# Patient Record
Sex: MALE | Race: WHITE | NOT HISPANIC OR LATINO | Employment: FULL TIME
[De-identification: names, ages, dates, MRNs, and addresses within clinical notes are randomized per-mention and may not be internally consistent; named-entity substitution may affect disease eponyms.]

---

## 2019-09-28 ENCOUNTER — HEALTH MAINTENANCE LETTER (OUTPATIENT)
Age: 59
End: 2019-09-28

## 2020-11-11 ENCOUNTER — OFFICE VISIT (OUTPATIENT)
Dept: FAMILY MEDICINE | Facility: CLINIC | Age: 60
End: 2020-11-11
Payer: COMMERCIAL

## 2020-11-11 VITALS
TEMPERATURE: 97.3 F | WEIGHT: 151 LBS | HEIGHT: 68 IN | DIASTOLIC BLOOD PRESSURE: 76 MMHG | RESPIRATION RATE: 16 BRPM | HEART RATE: 84 BPM | OXYGEN SATURATION: 99 % | BODY MASS INDEX: 22.88 KG/M2 | SYSTOLIC BLOOD PRESSURE: 134 MMHG

## 2020-11-11 DIAGNOSIS — Z00.00 ROUTINE GENERAL MEDICAL EXAMINATION AT A HEALTH CARE FACILITY: Primary | ICD-10-CM

## 2020-11-11 DIAGNOSIS — Z23 NEED FOR VACCINATION: ICD-10-CM

## 2020-11-11 DIAGNOSIS — Z11.59 ENCOUNTER FOR HEPATITIS C SCREENING TEST FOR LOW RISK PATIENT: ICD-10-CM

## 2020-11-11 DIAGNOSIS — Z11.4 SCREENING FOR HIV (HUMAN IMMUNODEFICIENCY VIRUS): ICD-10-CM

## 2020-11-11 LAB
ANION GAP SERPL CALCULATED.3IONS-SCNC: 5 MMOL/L (ref 3–14)
BUN SERPL-MCNC: 15 MG/DL (ref 7–30)
CALCIUM SERPL-MCNC: 8.8 MG/DL (ref 8.5–10.1)
CHLORIDE SERPL-SCNC: 105 MMOL/L (ref 94–109)
CHOLEST SERPL-MCNC: 176 MG/DL
CO2 SERPL-SCNC: 28 MMOL/L (ref 20–32)
CREAT SERPL-MCNC: 0.84 MG/DL (ref 0.66–1.25)
GFR SERPL CREATININE-BSD FRML MDRD: >90 ML/MIN/{1.73_M2}
GLUCOSE SERPL-MCNC: 97 MG/DL (ref 70–99)
HDLC SERPL-MCNC: 49 MG/DL
LDLC SERPL CALC-MCNC: 101 MG/DL
NONHDLC SERPL-MCNC: 127 MG/DL
POTASSIUM SERPL-SCNC: 4 MMOL/L (ref 3.4–5.3)
SODIUM SERPL-SCNC: 138 MMOL/L (ref 133–144)
TRIGL SERPL-MCNC: 130 MG/DL

## 2020-11-11 PROCEDURE — 87389 HIV-1 AG W/HIV-1&-2 AB AG IA: CPT | Performed by: FAMILY MEDICINE

## 2020-11-11 PROCEDURE — 80061 LIPID PANEL: CPT | Performed by: FAMILY MEDICINE

## 2020-11-11 PROCEDURE — 90471 IMMUNIZATION ADMIN: CPT | Performed by: FAMILY MEDICINE

## 2020-11-11 PROCEDURE — 99396 PREV VISIT EST AGE 40-64: CPT | Mod: 25 | Performed by: FAMILY MEDICINE

## 2020-11-11 PROCEDURE — 80048 BASIC METABOLIC PNL TOTAL CA: CPT | Performed by: FAMILY MEDICINE

## 2020-11-11 PROCEDURE — 86803 HEPATITIS C AB TEST: CPT | Performed by: FAMILY MEDICINE

## 2020-11-11 PROCEDURE — 90750 HZV VACC RECOMBINANT IM: CPT | Performed by: FAMILY MEDICINE

## 2020-11-11 PROCEDURE — 36415 COLL VENOUS BLD VENIPUNCTURE: CPT | Performed by: FAMILY MEDICINE

## 2020-11-11 ASSESSMENT — ENCOUNTER SYMPTOMS
NAUSEA: 0
SORE THROAT: 0
MYALGIAS: 0
COUGH: 0
WEAKNESS: 0
DYSURIA: 0
HEARTBURN: 0
ARTHRALGIAS: 0
HEMATOCHEZIA: 0
HEADACHES: 0
EYE PAIN: 0
PARESTHESIAS: 0
FREQUENCY: 0
CHILLS: 0
DIZZINESS: 0
CONSTIPATION: 0
NERVOUS/ANXIOUS: 0
ABDOMINAL PAIN: 0
PALPITATIONS: 0
HEMATURIA: 0
JOINT SWELLING: 0
DIARRHEA: 0
SHORTNESS OF BREATH: 0
FEVER: 0

## 2020-11-11 ASSESSMENT — MIFFLIN-ST. JEOR: SCORE: 1479.43

## 2020-11-11 ASSESSMENT — PAIN SCALES - GENERAL: PAINLEVEL: NO PAIN (0)

## 2020-11-11 NOTE — PROGRESS NOTES
SUBJECTIVE:   CC: Johny Jauregui is an 60 year old male who presents for preventative health visit.       Patient has been advised of split billing requirements and indicates understanding: Yes  Healthy Habits:     Getting at least 3 servings of Calcium per day:  Yes    Bi-annual eye exam:  Yes    Dental care twice a year:  Yes    Sleep apnea or symptoms of sleep apnea:  None    Diet:  Other    Frequency of exercise:  6-7 days/week    Duration of exercise:  30-45 minutes    Taking medications regularly:  Not Applicable    Barriers to taking medications:  None    Medication side effects:  Not applicable    PHQ-2 Total Score: 0    Additional concerns today:  Yes (screening blood test, vaccines)              Today's PHQ-2 Score:   PHQ-2 ( 1999 Pfizer) 11/11/2020   Q1: Little interest or pleasure in doing things 0   Q2: Feeling down, depressed or hopeless 0   PHQ-2 Score 0   Q1: Little interest or pleasure in doing things Not at all   Q2: Feeling down, depressed or hopeless Not at all   PHQ-2 Score 0       Abuse: Current or Past(Physical, Sexual or Emotional)- No  Do you feel safe in your environment? Yes    Have you ever done Advance Care Planning? (For example, a Health Directive, POLST, or a discussion with a medical provider or your loved ones about your wishes): Yes, patient states has an Advance Care Planning document and will bring a copy to the clinic.    Social History     Tobacco Use     Smoking status: Never Smoker     Smokeless tobacco: Never Used   Substance Use Topics     Alcohol use: Yes     Alcohol/week: 0.0 standard drinks     Comment: occ     If you drink alcohol do you typically have >3 drinks per day or >7 drinks per week? No    Alcohol Use 11/11/2020   Prescreen: >3 drinks/day or >7 drinks/week? No   Prescreen: >3 drinks/day or >7 drinks/week? -       Last PSA:   PSA   Date Value Ref Range Status   01/23/2009 1.53 0 - 4 ug/L Final       Reviewed orders with patient. Reviewed health maintenance and  updated orders accordingly - Yes  Labs reviewed in EPIC  BP Readings from Last 3 Encounters:   11/11/20 134/76   05/25/16 124/80   05/12/16 146/68    Wt Readings from Last 3 Encounters:   11/11/20 68.5 kg (151 lb)   05/25/16 70.8 kg (156 lb)   05/12/16 74.3 kg (163 lb 14.4 oz)                  Patient Active Problem List   Diagnosis     Cough     Allergic rhinitis due to other allergen     CARDIOVASCULAR SCREENING; LDL GOAL LESS THAN 160     Mass of face     Past Surgical History:   Procedure Laterality Date     C BECERRA W/O FACETEC FORAMOT/DSKC 1/2 VRT SEG, LUMBAR  11/2004    hemilaminectomy  L5-S1 and microdiscectomy     COLONOSCOPY N/A 11/12/2015    Procedure: COLONOSCOPY;  Surgeon: Glynn Angel MD;  [facility]     EXCISE MASS FACE Right 5/25/2016    Procedure: EXCISE MASS FACE;  Surgeon: SANDOVAL Kenny MD;  [facility]     HERNIA REPAIR, INGUINAL RT/LT  1/25/2010    Bilateral       Social History     Tobacco Use     Smoking status: Never Smoker     Smokeless tobacco: Never Used   Substance Use Topics     Alcohol use: Yes     Alcohol/week: 0.0 standard drinks     Comment: occ     Family History   Problem Relation Age of Onset     C.A.D. Father      Pancreatic Cancer Father      Hypertension Mother      Cancer Mother         Uterine     C.A.D. Maternal Grandmother      C.A.D. Paternal Grandfather      C.A.D. Paternal Grandmother      Neurologic Disorder Maternal Grandfather         Parkinson     Anesthesia Reaction No family hx of          No current outpatient medications on file.     Allergies   Allergen Reactions     No Known Drug Allergies      Recent Labs   Lab Test 10/27/15  1200   *   HDL 43   TRIG 127   CR 0.66   GFRESTIMATED >90  Non  GFR Calc     GFRESTBLACK >90   GFR Calc     POTASSIUM 4.1        Reviewed and updated as needed this visit by clinical staff  Tobacco  Allergies  Meds  Problems  Med Hx  Surg Hx  Fam Hx  Soc Hx       "    Reviewed and updated as needed this visit by Provider  Tobacco  Allergies  Meds  Problems  Med Hx  Surg Hx  Fam Hx             Review of Systems   Constitutional: Negative for chills and fever.   HENT: Negative for congestion, ear pain, hearing loss and sore throat.    Eyes: Negative for pain and visual disturbance.   Respiratory: Negative for cough and shortness of breath.    Cardiovascular: Negative for chest pain, palpitations and peripheral edema.   Gastrointestinal: Negative for abdominal pain, constipation, diarrhea, heartburn, hematochezia and nausea.   Genitourinary: Negative for discharge, dysuria, frequency, genital sores, hematuria, impotence and urgency.   Musculoskeletal: Negative for arthralgias, joint swelling and myalgias.   Skin: Negative for rash.   Neurological: Negative for dizziness, weakness, headaches and paresthesias.   Psychiatric/Behavioral: Negative for mood changes. The patient is not nervous/anxious.          OBJECTIVE:   /76 (BP Location: Right arm, Patient Position: Chair, Cuff Size: Adult Regular)   Pulse 84   Temp 97.3  F (36.3  C) (Temporal)   Resp 16   Ht 1.727 m (5' 8\")   Wt 68.5 kg (151 lb)   SpO2 99%   BMI 22.96 kg/m      Physical Exam  GENERAL: healthy, alert and no distress  EYES: Eyes grossly normal to inspection, PERRL and conjunctivae and sclerae normal  HENT: ear canals and TM's normal, nose and mouth without ulcers or lesions  NECK: no adenopathy, no asymmetry, masses, or scars and thyroid normal to palpation  RESP: lungs clear to auscultation - no rales, rhonchi or wheezes  CV: regular rate and rhythm, normal S1 S2, no S3 or S4, no murmur, click or rub, no peripheral edema and peripheral pulses strong  ABDOMEN: soft, nontender, no hepatosplenomegaly, no masses and bowel sounds normal  MS: no gross musculoskeletal defects noted, no edema  SKIN: no suspicious lesions or rashes  NEURO: Normal strength and tone, mentation intact and speech " "normal  PSYCH: mentation appears normal, affect normal/bright  LYMPH: no cervical, supraclavicular, axillary, or inguinal adenopathy    Diagnostic Test Results:  Labs reviewed in Epic    ASSESSMENT/PLAN:       ICD-10-CM    1. Routine general medical examination at a health care facility  Z00.00 Lipid panel reflex to direct LDL Fasting   2. Need for vaccination  Z23 SHINGRIX [84996]   3. Encounter for hepatitis C screening test for low risk patient  Z11.59 Hepatitis C antibody     Basic metabolic panel   4. Screening for HIV (human immunodeficiency virus)  Z11.4 HIV Antigen Antibody Combo       Patient has been advised of split billing requirements and indicates understanding: Yes  COUNSELING:   Reviewed preventive health counseling, as reflected in patient instructions       Regular exercise       Healthy diet/nutrition       Vision screening       Immunizations    Vaccinated for: Zoster             Consider Hep C screening for all patients one time for ages 18-79 years       HIV screeninx in teen years, 1x in adult years, and at intervals if high risk       Colon cancer screening       Prostate cancer screening    Estimated body mass index is 22.96 kg/m  as calculated from the following:    Height as of this encounter: 1.727 m (5' 8\").    Weight as of this encounter: 68.5 kg (151 lb).         He reports that he has never smoked. He has never used smokeless tobacco.      Counseling Resources:  ATP IV Guidelines  Pooled Cohorts Equation Calculator  FRAX Risk Assessment  ICSI Preventive Guidelines  Dietary Guidelines for Americans,   USDA's MyPlate  ASA Prophylaxis  Lung CA Screening    Moose Rivers MD  [facility]  "

## 2020-11-11 NOTE — NURSING NOTE
Prior to immunization administration, verified patients identity using patient s name and date of birth. Please see Immunization Activity for additional information.     Screening Questionnaire for Adult Immunization    Are you sick today?   No   Do you have allergies to medications, food, a vaccine component or latex?   No   Have you ever had a serious reaction after receiving a vaccination?   No   Do you have a long-term health problem with heart, lung, kidney, or metabolic disease (e.g., diabetes), asthma, a blood disorder, no spleen, complement component deficiency, a cochlear implant, or a spinal fluid leak?  Are you on long-term aspirin therapy?   No   Do you have cancer, leukemia, HIV/AIDS, or any other immune system problem?   No   Do you have a parent, brother, or sister with an immune system problem?   No   In the past 3 months, have you taken medications that affect  your immune system, such as prednisone, other steroids, or anticancer drugs; drugs for the treatment of rheumatoid arthritis, Crohn s disease, or psoriasis; or have you had radiation treatments?   No   Have you had a seizure, or a brain or other nervous system problem?   No   During the past year, have you received a transfusion of blood or blood    products, or been given immune (gamma) globulin or antiviral drug?   No   For women: Are you pregnant or is there a chance you could become       pregnant during the next month?   No   Have you received any vaccinations in the past 4 weeks?   Yes, flu     Immunization questionnaire was positive for at least one answer.  Notified Dr. Rivers.        Per orders of Dr. Rivers, injection of Shingrix  given by Leilani Leone MA. Patient instructed to remain in clinic for 15 minutes afterwards, and to report any adverse reaction to me immediately.       Screening performed by Leilani Leone MA on 11/11/2020 at 3:45 PM.

## 2020-11-12 LAB
HCV AB SERPL QL IA: NONREACTIVE
HIV 1+2 AB+HIV1 P24 AG SERPL QL IA: NONREACTIVE

## 2021-02-04 ENCOUNTER — ALLIED HEALTH/NURSE VISIT (OUTPATIENT)
Dept: FAMILY MEDICINE | Facility: CLINIC | Age: 61
End: 2021-02-04
Payer: COMMERCIAL

## 2021-02-04 DIAGNOSIS — Z23 NEED FOR VACCINATION: Primary | ICD-10-CM

## 2021-02-04 PROCEDURE — 99207 PR NO CHARGE NURSE ONLY: CPT

## 2021-02-04 PROCEDURE — 90471 IMMUNIZATION ADMIN: CPT

## 2021-02-04 PROCEDURE — 90750 HZV VACC RECOMBINANT IM: CPT

## 2021-10-23 ENCOUNTER — HEALTH MAINTENANCE LETTER (OUTPATIENT)
Age: 61
End: 2021-10-23

## 2021-12-18 ENCOUNTER — HEALTH MAINTENANCE LETTER (OUTPATIENT)
Age: 61
End: 2021-12-18

## 2022-10-09 ENCOUNTER — HEALTH MAINTENANCE LETTER (OUTPATIENT)
Age: 62
End: 2022-10-09

## 2023-02-18 ENCOUNTER — HEALTH MAINTENANCE LETTER (OUTPATIENT)
Age: 63
End: 2023-02-18

## 2023-09-06 ENCOUNTER — TRANSFERRED RECORDS (OUTPATIENT)
Dept: HEALTH INFORMATION MANAGEMENT | Facility: CLINIC | Age: 63
End: 2023-09-06
Payer: COMMERCIAL

## 2023-09-07 ENCOUNTER — OFFICE VISIT (OUTPATIENT)
Dept: FAMILY MEDICINE | Facility: CLINIC | Age: 63
End: 2023-09-07
Payer: COMMERCIAL

## 2023-09-07 VITALS
OXYGEN SATURATION: 100 % | DIASTOLIC BLOOD PRESSURE: 72 MMHG | HEIGHT: 68 IN | BODY MASS INDEX: 23.43 KG/M2 | TEMPERATURE: 97.5 F | RESPIRATION RATE: 16 BRPM | SYSTOLIC BLOOD PRESSURE: 118 MMHG | HEART RATE: 68 BPM | WEIGHT: 154.6 LBS

## 2023-09-07 DIAGNOSIS — Z00.00 ROUTINE GENERAL MEDICAL EXAMINATION AT A HEALTH CARE FACILITY: Primary | ICD-10-CM

## 2023-09-07 DIAGNOSIS — N52.9 ERECTILE DYSFUNCTION, UNSPECIFIED ERECTILE DYSFUNCTION TYPE: ICD-10-CM

## 2023-09-07 LAB
ALBUMIN SERPL BCG-MCNC: 4.5 G/DL (ref 3.5–5.2)
ALP SERPL-CCNC: 76 U/L (ref 40–129)
ALT SERPL W P-5'-P-CCNC: 22 U/L (ref 0–70)
ANION GAP SERPL CALCULATED.3IONS-SCNC: 11 MMOL/L (ref 7–15)
AST SERPL W P-5'-P-CCNC: 20 U/L (ref 0–45)
BILIRUB SERPL-MCNC: 0.9 MG/DL
BUN SERPL-MCNC: 15.3 MG/DL (ref 8–23)
CALCIUM SERPL-MCNC: 9.3 MG/DL (ref 8.8–10.2)
CHLORIDE SERPL-SCNC: 101 MMOL/L (ref 98–107)
CHOLEST SERPL-MCNC: 193 MG/DL
CREAT SERPL-MCNC: 0.89 MG/DL (ref 0.67–1.17)
DEPRECATED HCO3 PLAS-SCNC: 26 MMOL/L (ref 22–29)
EGFRCR SERPLBLD CKD-EPI 2021: >90 ML/MIN/1.73M2
GLUCOSE SERPL-MCNC: 102 MG/DL (ref 70–99)
HDLC SERPL-MCNC: 44 MG/DL
LDLC SERPL CALC-MCNC: 120 MG/DL
NONHDLC SERPL-MCNC: 149 MG/DL
POTASSIUM SERPL-SCNC: 4 MMOL/L (ref 3.4–5.3)
PROT SERPL-MCNC: 7.4 G/DL (ref 6.4–8.3)
PSA SERPL DL<=0.01 NG/ML-MCNC: 2.84 NG/ML (ref 0–4.5)
SODIUM SERPL-SCNC: 138 MMOL/L (ref 136–145)
TRIGL SERPL-MCNC: 145 MG/DL
TSH SERPL DL<=0.005 MIU/L-ACNC: 1.55 UIU/ML (ref 0.3–4.2)

## 2023-09-07 PROCEDURE — G0103 PSA SCREENING: HCPCS | Performed by: FAMILY MEDICINE

## 2023-09-07 PROCEDURE — 36415 COLL VENOUS BLD VENIPUNCTURE: CPT | Performed by: FAMILY MEDICINE

## 2023-09-07 PROCEDURE — 80061 LIPID PANEL: CPT | Performed by: FAMILY MEDICINE

## 2023-09-07 PROCEDURE — 99396 PREV VISIT EST AGE 40-64: CPT | Performed by: FAMILY MEDICINE

## 2023-09-07 PROCEDURE — 84443 ASSAY THYROID STIM HORMONE: CPT | Performed by: FAMILY MEDICINE

## 2023-09-07 PROCEDURE — 80053 COMPREHEN METABOLIC PANEL: CPT | Performed by: FAMILY MEDICINE

## 2023-09-07 RX ORDER — SILDENAFIL CITRATE 20 MG/1
60 TABLET ORAL DAILY PRN
Qty: 60 TABLET | Refills: 0 | Status: SHIPPED | OUTPATIENT
Start: 2023-09-07

## 2023-09-07 ASSESSMENT — ENCOUNTER SYMPTOMS
HEMATURIA: 0
HEARTBURN: 0
FREQUENCY: 0
HEADACHES: 0
CHILLS: 0
ARTHRALGIAS: 0
PALPITATIONS: 0
PARESTHESIAS: 0
DIZZINESS: 0
CONSTIPATION: 0
COUGH: 0
EYE PAIN: 0
NAUSEA: 0
FEVER: 0
SHORTNESS OF BREATH: 0
MYALGIAS: 0
DYSURIA: 0
HEMATOCHEZIA: 0
WEAKNESS: 0
SORE THROAT: 0
JOINT SWELLING: 0
NERVOUS/ANXIOUS: 0
ABDOMINAL PAIN: 0
DIARRHEA: 0

## 2023-09-07 ASSESSMENT — PAIN SCALES - GENERAL: PAINLEVEL: NO PAIN (0)

## 2023-09-07 NOTE — PROGRESS NOTES
SUBJECTIVE:   CC: Richard is an 62 year old who presents for preventative health visit.       9/7/2023    12:48 PM   Additional Questions   Roomed by Pearl FRENCH         9/7/2023    12:48 PM   Patient Reported Additional Medications   Patient reports taking the following new medications multi vitamin       Healthy Habits:     Getting at least 3 servings of Calcium per day:  Yes    Bi-annual eye exam:  Yes    Dental care twice a year:  Yes    Sleep apnea or symptoms of sleep apnea:  None    Diet:  Low salt    Frequency of exercise:  4-5 days/week    Duration of exercise:  30-45 minutes    Taking medications regularly:  Yes    Medication side effects:  Muscle aches      Today's PHQ-2 Score:       9/7/2023    12:43 PM   PHQ-2 ( 1999 Pfizer)   Q1: Little interest or pleasure in doing things 0   Q2: Feeling down, depressed or hopeless 0   PHQ-2 Score 0   Q1: Little interest or pleasure in doing things Not at all   Q2: Feeling down, depressed or hopeless Not at all   PHQ-2 Score 0                       Social History     Tobacco Use    Smoking status: Never    Smokeless tobacco: Never   Substance Use Topics    Alcohol use: Yes     Alcohol/week: 0.0 standard drinks of alcohol     Comment: occ             9/7/2023    12:43 PM   Alcohol Use   Prescreen: >3 drinks/day or >7 drinks/week? No       Last PSA:   PSA   Date Value Ref Range Status   01/23/2009 1.53 0 - 4 ug/L Final       Reviewed orders with patient. Reviewed health maintenance and updated orders accordingly - Yes  Labs reviewed in EPIC  BP Readings from Last 3 Encounters:   09/07/23 118/72   11/11/20 134/76   05/25/16 124/80    Wt Readings from Last 3 Encounters:   09/07/23 70.1 kg (154 lb 9.6 oz)   11/11/20 68.5 kg (151 lb)   05/25/16 70.8 kg (156 lb)                  Patient Active Problem List   Diagnosis    Cough    Allergic rhinitis due to other allergen    CARDIOVASCULAR SCREENING; LDL GOAL LESS THAN 160    Mass of face     Past Surgical History:   Procedure  Laterality Date    COLONOSCOPY N/A 11/12/2015    Procedure: COLONOSCOPY;  Surgeon: Glynn Angel MD;  [facility]    EXCISE MASS FACE Right 5/25/2016    Procedure: EXCISE MASS FACE;  Surgeon: SANDOVAL Kenny MD;  [facility]    HERNIA REPAIR, INGUINAL RT/LT  1/25/2010    Bilateral    ZZC BECERRA W/O FACETEC FORAMOT/DSKC 1/2 VRT SEG, LUMBAR  11/2004    hemilaminectomy  L5-S1 and microdiscectomy       Social History     Tobacco Use    Smoking status: Never    Smokeless tobacco: Never   Substance Use Topics    Alcohol use: Yes     Alcohol/week: 0.0 standard drinks of alcohol     Comment: occ     Family History   Problem Relation Age of Onset    C.A.D. Father     Pancreatic Cancer Father     Hypertension Mother     Cancer Mother         Uterine    C.A.D. Maternal Grandmother     C.A.D. Paternal Grandfather     C.A.D. Paternal Grandmother     Neurologic Disorder Maternal Grandfather         Parkinson    Anesthesia Reaction No family hx of          No current outpatient medications on file.     Allergies   Allergen Reactions    No Known Drug Allergy      Recent Labs   Lab Test 11/11/20  1650 10/27/15  1200   * 144*   HDL 49 43   TRIG 130 127   CR 0.84 0.66   GFRESTIMATED >90 >90  Non African American GFR Calc     GFRESTBLACK >90 >90  African American GFR Calc     POTASSIUM 4.0 4.1        Reviewed and updated as needed this visit by clinical staff   Tobacco  Allergies  Meds              Reviewed and updated as needed this visit by Provider                     Review of Systems   Constitutional:  Negative for chills and fever.   HENT:  Negative for congestion, ear pain, hearing loss and sore throat.    Eyes:  Negative for pain and visual disturbance.   Respiratory:  Negative for cough and shortness of breath.    Cardiovascular:  Negative for chest pain, palpitations and peripheral edema.   Gastrointestinal:  Negative for abdominal pain, constipation, diarrhea, heartburn, hematochezia and nausea.  "  Genitourinary:  Negative for dysuria, frequency, genital sores, hematuria, impotence, penile discharge and urgency.   Musculoskeletal:  Negative for arthralgias, joint swelling and myalgias.   Skin:  Negative for rash.   Neurological:  Negative for dizziness, weakness, headaches and paresthesias.   Psychiatric/Behavioral:  Negative for mood changes. The patient is not nervous/anxious.          OBJECTIVE:   /72 (BP Location: Right arm, Patient Position: Chair)   Pulse 68   Temp 97.5  F (36.4  C) (Temporal)   Resp 16   Ht 1.721 m (5' 7.75\")   Wt 70.1 kg (154 lb 9.6 oz)   SpO2 100%   BMI 23.68 kg/m      Physical Exam  GENERAL: healthy, alert and no distress  EYES: Eyes grossly normal to inspection, PERRL and conjunctivae and sclerae normal  HENT: ear canals and TM's normal, nose and mouth without ulcers or lesions  NECK: no adenopathy, no asymmetry, masses, or scars and thyroid normal to palpation  RESP: lungs clear to auscultation - no rales, rhonchi or wheezes  CV: regular rate and rhythm, normal S1 S2, no S3 or S4, no murmur, click or rub, no peripheral edema and peripheral pulses strong  ABDOMEN: soft, nontender, no hepatosplenomegaly, no masses and bowel sounds normal  MS: no gross musculoskeletal defects noted, no edema  NEURO: Normal strength and tone, mentation intact and speech normal  PSYCH: mentation appears normal, affect normal/bright  LYMPH: no cervical, supraclavicular, axillary, or inguinal adenopathy    Diagnostic Test Results:  Labs reviewed in Epic    ASSESSMENT/PLAN:       ICD-10-CM    1. Routine general medical examination at a health care facility  Z00.00 Lipid panel reflex to direct LDL Fasting     TSH with free T4 reflex     Comprehensive metabolic panel (BMP + Alb, Alk Phos, ALT, AST, Total. Bili, TP)     PSA, screen     Lipid panel reflex to direct LDL Fasting     TSH with free T4 reflex     Comprehensive metabolic panel (BMP + Alb, Alk Phos, ALT, AST, Total. Bili, TP)     PSA, " screen      2. Erectile dysfunction, unspecified erectile dysfunction type  N52.9 sildenafil (REVATIO) 20 MG tablet          Patient has been advised of split billing requirements and indicates understanding: Yes  1. Routine general medical examination at a health care facility    - Lipid panel reflex to direct LDL Fasting; Future  - TSH with free T4 reflex; Future  - Comprehensive metabolic panel (BMP + Alb, Alk Phos, ALT, AST, Total. Bili, TP); Future  - PSA, screen; Future  - Lipid panel reflex to direct LDL Fasting  - TSH with free T4 reflex  - Comprehensive metabolic panel (BMP + Alb, Alk Phos, ALT, AST, Total. Bili, TP)  - PSA, screen    2. Erectile dysfunction, unspecified erectile dysfunction type  The patient desires Viagra to treat his erectile dysfunction. History and physical exam has not disclosed any obvious treatable cause of this complaint. He is informed that Viagra is usually not covered by insurance. It is available on a fee-for-service cost basis, and is relatively expensive. He can start with 50 mg dose, and increase to 100 mg if necessary. The method of use 1 hour prior to anticipated intercourse is explained. He should not use any more than one tablet in a 24 hour period. The side effects of possible headache, flushing, dyspepsia and transient changes in vision have been explained.     The patient is not taking nitrates, and denies he has access to nitrates in any form at any time. I have counseled him that taking Viagra with nitrates of any form can cause death. Additionally, Viagra serum concentrations can be increased by the following: cimetidine, erythromycin, itraconazole or ketoconazole. This patient does not take these drugs, but I have counseled him to avoid Viagra if he does take any of these.    We have also discussed the fact that there have been some deaths in patients after taking Viagra, felt due to the exertion of intercourse rather than the drug itself. The patient is aware of  this, and accepts whatever unknown degree of risk there is in this aspect.    - sildenafil (REVATIO) 20 MG tablet; Take 3 tablets (60 mg) by mouth daily as needed  Dispense: 60 tablet; Refill: 0        COUNSELING:   Reviewed preventive health counseling, as reflected in patient instructions       Regular exercise       Healthy diet/nutrition       Vision screening       Immunizations  Declined: Covid-19 and Influenza due to Other will obtain later this month             Colorectal cancer screening       Prostate cancer screening        He reports that he has never smoked. He has never used smokeless tobacco.            Moose Rivers MD  [facility]

## 2023-09-08 ENCOUNTER — TRANSFERRED RECORDS (OUTPATIENT)
Dept: HEALTH INFORMATION MANAGEMENT | Facility: CLINIC | Age: 63
End: 2023-09-08

## 2023-11-03 ENCOUNTER — MYC MEDICAL ADVICE (OUTPATIENT)
Dept: FAMILY MEDICINE | Facility: CLINIC | Age: 63
End: 2023-11-03
Payer: COMMERCIAL

## 2023-11-03 DIAGNOSIS — R93.89 ABNORMAL CHEST X-RAY: Primary | ICD-10-CM

## 2023-11-03 NOTE — TELEPHONE ENCOUNTER
Can you sign this order in Dr. Rivers absence Dr. Nelson.  I printed the attachment of the result of the xray and will get this in his chart right away.  Route back to me and I can get him scheduled.

## 2023-11-06 ENCOUNTER — HOSPITAL ENCOUNTER (OUTPATIENT)
Dept: CT IMAGING | Facility: CLINIC | Age: 63
Discharge: HOME | End: 2023-11-06
Attending: INTERNAL MEDICINE | Admitting: INTERNAL MEDICINE
Payer: COMMERCIAL

## 2023-11-06 ENCOUNTER — IMMUNIZATION (OUTPATIENT)
Dept: FAMILY MEDICINE | Facility: CLINIC | Age: 63
End: 2023-11-06
Payer: COMMERCIAL

## 2023-11-06 DIAGNOSIS — R93.89 ABNORMAL CHEST X-RAY: ICD-10-CM

## 2023-11-06 PROCEDURE — 71250 CT THORAX DX C-: CPT

## 2023-11-06 PROCEDURE — 90682 RIV4 VACC RECOMBINANT DNA IM: CPT

## 2023-11-06 PROCEDURE — 90471 IMMUNIZATION ADMIN: CPT

## 2023-11-07 ENCOUNTER — PATIENT OUTREACH (OUTPATIENT)
Dept: ONCOLOGY | Facility: CLINIC | Age: 63
End: 2023-11-07
Payer: COMMERCIAL

## 2023-11-07 DIAGNOSIS — R91.8 PULMONARY NODULES: Primary | ICD-10-CM

## 2023-11-07 NOTE — PROGRESS NOTES
New IP (Interventional Pulmonology) referral rec'd.  Chart reviewed.         New Patient: Interventional Pulmonary (Lung nodule) Nurse Navigator Note    Referring provider: Alfredo Nelson, [facility]    Referred to (specialty): Interventional Pulmonary (Lung nodule)    Requested provider (if applicable): n/a    Date Referral Received: 11/7/2023    Evaluation for :  Abnormal chest xray in a study for cough, then CT shows 12 mm nodule.    Clinical History (per Nurse review of records provided):    **BOOK MARKED**  CT CHEST W/O CONTRAST 11/6/2023 1:59 PM     CLINICAL HISTORY: Abnormal chest x-ray     TECHNIQUE: CT chest without IV contrast. Multiplanar reformats were  obtained. Dose reduction techniques were used.  CONTRAST: None.     COMPARISON: No comparison imaging is available.     FINDINGS:   LUNGS AND PLEURA: Indeterminate 12 x 7 mm right upper lobe nodule. A  few small surrounding satellite nodules measuring 3-4 mm. No  infiltrate or pleural effusion. The central tracheobronchial tree is  clear. No emphysema, bronchiectasis or pulmonary fibrosis.     MEDIASTINUM/AXILLAE: No lymphadenopathy. No thoracic aortic aneurysm.  Mild coronary artery calcifications. Small hiatal hernia.     UPPER ABDOMEN: No significant finding.     MUSCULOSKELETAL: Unremarkable.                                                                      IMPRESSION:   1.  Indeterminate 12 x 7 mm right upper lobe nodule with few  surrounding satellite nodules measuring 3-4 mm.. Please see follow-up  guidelines.    Records Location: [facility]     Records Needed: none    Additional testing needed prior to consult: PFT's

## 2023-11-29 NOTE — TELEPHONE ENCOUNTER
RECORDS STATUS - ALL OTHER DIAGNOSIS      Action November 29, 2023 4:04 PM    Action Taken Called and left [facility] imaging a VM to push image to  PACS.      Imaging Received  December 5, 2023 9:37 AM    Action: Images from [facility] received and resolved to PACS.     RECORDS RECEIVED FROM: Epic   DATE RECEIVED:    NOTES STATUS DETAILS   OFFICE NOTE from referring provider Epic Dr. Alfredo Nelson   MEDICATION LIST [facility]    LABS     ANYTHING RELATED TO DIAGNOSIS Epic 9/7/23   IMAGING (NEED IMAGES & REPORT)     CT SCANS PACS 11/6/23: CT Chest   XRAYS (Img req from [facility] imaging) 9/8/23: XR Chest

## 2023-12-13 ENCOUNTER — PRE VISIT (OUTPATIENT)
Dept: PULMONOLOGY | Facility: CLINIC | Age: 63
End: 2023-12-13

## 2023-12-13 ENCOUNTER — ONCOLOGY VISIT (OUTPATIENT)
Dept: PULMONOLOGY | Facility: CLINIC | Age: 63
End: 2023-12-13
Attending: INTERNAL MEDICINE
Payer: COMMERCIAL

## 2023-12-13 VITALS
HEART RATE: 83 BPM | BODY MASS INDEX: 22.93 KG/M2 | TEMPERATURE: 98.3 F | HEIGHT: 67 IN | RESPIRATION RATE: 16 BRPM | OXYGEN SATURATION: 99 % | SYSTOLIC BLOOD PRESSURE: 158 MMHG | DIASTOLIC BLOOD PRESSURE: 79 MMHG | WEIGHT: 146.1 LBS

## 2023-12-13 DIAGNOSIS — R91.8 PULMONARY NODULES: ICD-10-CM

## 2023-12-13 PROCEDURE — 99213 OFFICE O/P EST LOW 20 MIN: CPT | Performed by: INTERNAL MEDICINE

## 2023-12-13 PROCEDURE — 99204 OFFICE O/P NEW MOD 45 MIN: CPT | Performed by: INTERNAL MEDICINE

## 2023-12-13 ASSESSMENT — PAIN SCALES - GENERAL: PAINLEVEL: NO PAIN (0)

## 2023-12-13 NOTE — NURSING NOTE
"Oncology Rooming Note    December 13, 2023 10:06 AM   Johny Jauregui is a 63 year old male who presents for:    Chief Complaint   Patient presents with    Oncology Clinic Visit     Pulmonary nodules     Initial Vitals: BP (!) 158/79 (BP Location: Right arm, Patient Position: Sitting, Cuff Size: Adult Regular)   Pulse 83   Temp 98.3  F (36.8  C) (Oral)   Resp 16   Ht 1.7 m (5' 6.93\")   Wt 66.3 kg (146 lb 1.6 oz)   SpO2 99%   BMI 22.93 kg/m   Estimated body mass index is 22.93 kg/m  as calculated from the following:    Height as of this encounter: 1.7 m (5' 6.93\").    Weight as of this encounter: 66.3 kg (146 lb 1.6 oz). Body surface area is 1.77 meters squared.  No Pain (0) Comment: Data Unavailable   No LMP for male patient.  Allergies reviewed: Yes  Medications reviewed: Yes    Medications: Medication refills not needed today.  Pharmacy name entered into [facility]:    [facility] [place], [place] - [address] [facility] [place] [place] - [address] [facility] [place] [address] [facility] [address] [place], [place] - [address] [facility] [address] [place] [address] - [address] [place]    Clinical concerns: none.       Michael Arguello"

## 2023-12-13 NOTE — PROGRESS NOTES
LUNG NODULE & INTERVENTIONAL PULMONARY CLINIC  [facility]     Johny Jauregui MRN# [id number]   Age: 63 year old YOB: 1960     Reason for Consultation: Lung Nodule    Requesting Physician: Alfredo Nelson MD  [address] [place] [address]    Assessment and Plan:    1. New solitary pulmonary lung nodule(s). Given the characteristics on current/previous imaging and risk factors; I would classify this to be Intermediate (6-65%) risk for cancer. Likely low risk however it is 12mm. Pursue surveillance, next CT in 3mo       Billing: I spent a total of 45min spent on date of encounter which includes prep time, visit with the patient and post visit work including documentation and nursing communication     Rodri Lemus MD, MHA  Associate Professor of Medicine  Section of Interventional Pulmonology   Division of Pulmonary, Allergy, Critical Care and Sleep Medicine   [facility]  Pager: [phone]   Office: [phone]  Email: [email or web address]    Roselia Johnson RN   Interventional Pulmonary Care Coordinator   Office: [phone]  Email: [email or web address]     Bry Steele  Interventional Pulmonary Surgery Scheduler   Office: [phone]  Email: [email or web address]         History:     Johny Jauregui is a 63 year old male with sig h/o for chronic cough, LGL syndrome who is here for evaluation/followup of Lung Nodule.    - No new resp sx or complaints. Denies dyspnea or cough.   - Incidental nodule on cxr and had CT 3mo after. Here for evaluation  - Personal hx of cancer: no  - Family hx of cancer: no. Father had pancreatic cancer   - Exposure hx: Denies asbestos or radon exposure. Vac[place] in [place] in [place]  - Tobacco hx: Never Smoker.   - My interpretation of the images relevant for this visit includes: nodule   - My interpretation of the PFT's relevant for this  visit includes: None     Culprit Nodule(s):   1: Right upper lobe nodule and is 12 mm in size/severity and non-calcified in morphology/quality. First seen by chest CT on 11/6/23. First observed on this date .    Other active medical problems include:   - NA          Past Medical History:      Past Medical History:   Diagnosis Date    Backache, unspecified 10/2003    to neurosurgeon    Ybul-Cksedk-Wrizil syndrome     short [place] interval with palpatations           Past Surgical History:      Past Surgical History:   Procedure Laterality Date    COLONOSCOPY N/A 11/12/2015    Procedure: COLONOSCOPY;  Surgeon: Glynn Angel MD;  [facility]    EXCISE MASS FACE Right 5/25/2016    Procedure: EXCISE MASS FACE;  Surgeon: SANDOVAL Kenny MD;  [facility]    HERNIA REPAIR, INGUINAL RT/LT  1/25/2010    Bilateral    ZZC BECERRA W/O FACETEC FORAMOT/DSKC 1/2 VRT SEG, LUMBAR  11/2004    hemilaminectomy  L5-S1 and microdiscectomy          Social History:     Social History     Tobacco Use    Smoking status: Never    Smokeless tobacco: Never   Substance Use Topics    Alcohol use: Yes     Alcohol/week: 0.0 standard drinks of alcohol     Comment: occ          Family History:     Family History   Problem Relation Age of Onset    C.A.D. Father     Pancreatic Cancer Father     Hypertension Mother     Cancer Mother         Uterine    C.A.D. Maternal Grandmother     C.A.D. Paternal Grandfather     C.A.D. Paternal Grandmother     Neurologic Disorder Maternal Grandfather         Parkinson    Anesthesia Reaction No family hx of            Allergies:    No Known Allergies       Medications:     Current Outpatient Medications   Medication Sig    sildenafil (REVATIO) 20 MG tablet Take 3 tablets (60 mg) by mouth daily as needed     No current facility-administered medications for this visit.            Review of Systems:     12-point ROS reviewed and abnormalities stated in the history.         Physical Exam:     Constitutional -  looks well, in no apparent distress  Respiratory -breathing appears comfortable.   Skin - No appreciable discoloration or lesions (very limited exam)  Neurological - No apparent tremors. Speech fluent and articlate          Current Laboratory Data:   All laboratory and imaging data reviewed.    No results found for this or any previous visit (from the past 24 hour(s)).       No data to display                    [facility] Cutters CT scan Radiation Dose  Low dose Chest CT (DLP 50) = 0.6 mSV  Full Chest CT (DLP=50) = 1.2 mSV   Minimum effective dose to have risk for radiation induced cancer =  mSV

## 2024-03-04 ENCOUNTER — HOSPITAL ENCOUNTER (OUTPATIENT)
Dept: CT IMAGING | Facility: CLINIC | Age: 64
Discharge: HOME | End: 2024-03-04
Attending: INTERNAL MEDICINE | Admitting: INTERNAL MEDICINE
Payer: COMMERCIAL

## 2024-03-04 DIAGNOSIS — R91.8 PULMONARY NODULES: ICD-10-CM

## 2024-03-04 PROCEDURE — 71250 CT THORAX DX C-: CPT

## 2024-03-05 ENCOUNTER — VIRTUAL VISIT (OUTPATIENT)
Dept: PULMONOLOGY | Facility: CLINIC | Age: 64
End: 2024-03-05
Attending: INTERNAL MEDICINE
Payer: COMMERCIAL

## 2024-03-05 VITALS — WEIGHT: 154 LBS | HEIGHT: 68 IN | BODY MASS INDEX: 23.34 KG/M2

## 2024-03-05 DIAGNOSIS — R91.8 PULMONARY NODULES: Primary | ICD-10-CM

## 2024-03-05 PROCEDURE — 99215 OFFICE O/P EST HI 40 MIN: CPT | Mod: 95 | Performed by: INTERNAL MEDICINE

## 2024-03-05 ASSESSMENT — PAIN SCALES - GENERAL: PAINLEVEL: NO PAIN (0)

## 2024-03-05 NOTE — PROGRESS NOTES
"LUNG NODULE & INTERVENTIONAL PULMONARY CLINIC  CLINICS & SURGERY CENTER, [facility], [facility]   VIDEO VISIT    Johny Jauregui MRN# [id number]   Age: 63 year old YOB: 1960     Reason for Consultation: Lung Nodule    Requesting Physician: Rodri Lemus MD  [address] [place] [address]       The patient has been notified of following:   \"This video visit will be conducted via a call between you and your physician/provider. We have found that certain health care needs can be provided without the need for an in-person physical exam.  This service lets us provide the care you need with a video conversation.  If a prescription is necessary we can send it directly to your pharmacy.  If lab work is needed we can place an order for that and you can then stop by our lab to have the test done at a later time.  Video visits are billed at different rates depending on your insurance coverage.  Please reach out to your insurance provider with any questions.  If during the course of the call the physician/provider feels a video visit is not appropriate, you will not be charged for this service.\"  Patient has given verbal consent for Video visit? Yes  How would you like to obtain your AVS? Please refer to rooming staff note  Patient would like the video invitation sent by: Please refer to rooming staff note   Will anyone else be joining your video visit? Please refer to rooming staff note       Video-Visit Details     Type of service:  Video Visit  Video Start Time: 124  Video End Time: 141  Provider Name: Rodri Lemus MD, [facility]   Originating Location (pt. Location): Home  Provider Location: Off campus   Distant Location (provider location): Home/Clinic  Platform used for Video Visit: [facility]    Assessment and Plan:    1. New solitary pulmonary lung nodule(s). Given the characteristics on current/previous imaging and risk factors; I would classify this to be " Intermediate (6-65%) risk for cancer. Likely low risk however it is 12mm. Pursue surveillance, next CT in 6mo     Billing: I spent a total of 45min spent on date of encounter which includes prep time, visit with the patient and post visit work including documentation and nursing communication     Rodri Lemus MD, MHA  Associate Professor of Medicine  Section of Interventional Pulmonology   Division of Pulmonary, Allergy, Critical Care and Sleep Medicine   [facility] [employer]  Pager: [phone]   Office: [phone]  Email: [email or web address]    Roselia Johnson RN   Interventional Pulmonary Care Coordinator   Office: [phone]  Email: [email or web address]     Bry Steele  Interventional Pulmonary Surgery Scheduler   Office: [phone]  Email: [email or web address]         History:     Johny Jauregui is a 63 year old male with sig h/o for chronic cough, LGL syndrome who is here for evaluation/followup of Lung Nodule.     - No new resp sx or complaints. Denies dyspnea or cough.   - Incidental nodule on cxr and had CT 3mo after. Here for 3mo evaluation  - Personal hx of cancer: no  - Family hx of cancer: no. Father had pancreatic cancer   - Exposure hx: Denies asbestos or radon exposure. Vac[place] in [place] in the [place]  - Tobacco hx: Never Smoker.   - My interpretation of the images relevant for this visit includes: nodule   - My interpretation of the PFT's relevant for this visit includes: None      Culprit Nodule(s):   1: Right upper lobe nodule and is 12 mm in size/severity and non-calcified in morphology/quality. First seen by chest CT on 11/6/23. No interval growth     Other active medical problems include:   - NA          Past Medical History:      Past Medical History:   Diagnosis Date    Backache, unspecified 10/2003    to neurosurgeon    Mctf-Utsxez-Lymsnf syndrome     short [place] interval with palpatations             Past Surgical History:      Past Surgical  History:   Procedure Laterality Date    COLONOSCOPY N/A 11/12/2015    Procedure: COLONOSCOPY;  Surgeon: Glynn Angel MD;  [facility]    EXCISE MASS FACE Right 5/25/2016    Procedure: EXCISE MASS FACE;  Surgeon: SANDOVAL Kenny MD;  [facility]    HERNIA REPAIR, INGUINAL RT/LT  1/25/2010    Bilateral    ZZC BECERRA W/O FACETEC FORAMOT/DSKC 1/2 VRT SEG, LUMBAR  11/2004    hemilaminectomy  L5-S1 and microdiscectomy            Social History:     Social History     Tobacco Use    Smoking status: Never    Smokeless tobacco: Never   Substance Use Topics    Alcohol use: Yes     Alcohol/week: 0.0 standard drinks of alcohol     Comment: occ            Family History:     Family History   Problem Relation Age of Onset    C.A.D. Father     Pancreatic Cancer Father     Hypertension Mother     Cancer Mother         Uterine    C.A.D. Maternal Grandmother     C.A.D. Paternal Grandfather     C.A.D. Paternal Grandmother     Neurologic Disorder Maternal Grandfather         Parkinson    Anesthesia Reaction No family hx of              Allergies:    No Known Allergies         Medications:     Current Outpatient Medications   Medication Sig    sildenafil (REVATIO) 20 MG tablet Take 3 tablets (60 mg) by mouth daily as needed     No current facility-administered medications for this visit.            Review of Systems:     12-point ROS reviewed and abnormalities stated in the history.         Physical Exam:     Constitutional - looks well, in no apparent distress  Eyes - no redness or discharge  Respiratory -breathing appears comfortable.  No cough.  Skin - No appreciable discoloration or lesions (very limited exam)  Neurological - No apparent tremors. Speech fluent and articlate  Psychiatric - no signs of delirium or anxiety     Exam limited to that easily identified on a virtual visit. The rest of a comprehensive physical examination is deferred due to PHE (public health emergency) video visit restrictions.          Current Laboratory Data:   All laboratory and imaging data reviewed.           No data to display

## 2024-03-05 NOTE — NURSING NOTE
Is the patient currently in the state of [place]? YES    Visit mode:VIDEO    If the visit is dropped, the patient can be reconnected by: VIDEO VISIT: Send to e-mail at: [email or web address]    Will anyone else be joining the visit? NO  (If patient encounters technical issues they should call [phone])    How would you like to obtain your AVS? MyChart    Are changes needed to the allergy or medication list? No    Reason for visit: [facility] GRANDA

## 2024-03-05 NOTE — LETTER
"3/5/2024       RE: Johny Jauregui  [address] [facility] [address]     Dear Colleague,    Thank you for referring your patient, [facility]uregui[facility]. Please see a copy of my visit note below.    Virtual Visit Details    Type of service:  Video Visit     See note    LUNG NODULE & INTERVENTIONAL PULMONARY CLINIC  CLINICS & SURGERY [place], [facility]   VIDEO VISIT    Johny Jauregui MRN# [id number]   Age: 63 year old YOB: 1960     Reason for Consultation: Lung Nodule    Requesting Physician: Rodri Lemus MD  [address] [place] [address]       The patient has been notified of following:   \"This video visit will be conducted via a call between you and your physician/provider. We have found that certain health care needs can be provided without the need for an in-person physical exam.  This service lets us provide the care you need with a video conversation.  If a prescription is necessary we can send it directly to your pharmacy.  If lab work is needed we can place an order for that and you can then stop by our lab to have the test done at a later time.  Video visits are billed at different rates depending on your insurance coverage.  Please reach out to your insurance provider with any questions.  If during the course of the call the physician/provider feels a video visit is not appropriate, you will not be charged for this service.\"  Patient has given verbal consent for Video visit? Yes  How would you like to obtain your AVS? Please refer to rooming staff note  Patient would like the video invitation sent by: Please refer to rooming staff note   Will anyone else be joining your video visit? Please refer to rooming staff note       Video-Visit Details     Type of service:  Video Visit  Video Start Time: 124  Video End Time: " 141  Provider Name: Rodri Lemus MD, MHA   Originating Location (pt. Location): Home  Provider Location: Off campus   Distant Location (provider location): Home/Clinic  Platform used for Video Visit: [facility]    Assessment and Plan:    1. New solitary pulmonary lung nodule(s). Given the characteristics on current/previous imaging and risk factors; I would classify this to be Intermediate (6-65%) risk for cancer. Likely low risk however it is 12mm. Pursue surveillance, next CT in 6mo     Billing: I spent a total of 45min spent on date of encounter which includes prep time, visit with the patient and post visit work including documentation and nursing communication     Rodri Lemus MD, MHA  Associate Professor of Medicine  Section of Interventional Pulmonology   Division of Pulmonary, Allergy, Critical Care and Sleep Medicine   [facility]  Pager: [phone]   Office: [phone]  Email: [email or web address]    Roselia Johnson RN   Interventional Pulmonary Care Coordinator   Office: [phone]  Email: [email or web address]     Bry Steele  Interventional Pulmonary Surgery Scheduler   Office: [phone]  Email: [email or web address]         History:     Johny Jauregui is a 63 year old male with sig h/o for chronic cough, LGL syndrome who is here for evaluation/followup of Lung Nodule.     - No new resp sx or complaints. Denies dyspnea or cough.   - Incidental nodule on cxr and had CT 3mo after. Here for 3mo evaluation  - Personal hx of cancer: no  - Family hx of cancer: no. Father had pancreatic cancer   - Exposure hx: Denies asbestos or radon exposure. [place] in [place] in [place]  - Tobacco hx: Never Smoker.   - My interpretation of the images relevant for this visit includes: nodule   - My interpretation of the PFT's relevant for this visit includes: None      Culprit Nodule(s):   1: Right upper lobe nodule and is 12 mm in size/severity and non-calcified  in morphology/quality. First seen by chest CT on 11/6/23. No interval growth     Other active medical problems include:   - NA          Past Medical History:      Past Medical History:   Diagnosis Date     Backache, unspecified 10/2003    to neurosurgeon     Jtwb-Jqpjil-Nnuvqs syndrome     short [place] interval with palpatations             Past Surgical History:      Past Surgical History:   Procedure Laterality Date     COLONOSCOPY N/A 11/12/2015    Procedure: COLONOSCOPY;  Surgeon: Glynn Angel MD;  [facility]     EXCISE MASS FACE Right 5/25/2016    Procedure: EXCISE MASS FACE;  Surgeon: SANDOVAL Kenny MD;  [facility]     HERNIA REPAIR, INGUINAL RT/LT  1/25/2010    Bilateral     ZZC BECERRA W/O FACETEC FORAMOT/DSKC 1/2 VRT SEG, LUMBAR  11/2004    hemilaminectomy  L5-S1 and microdiscectomy            Social History:     Social History     Tobacco Use     Smoking status: Never     Smokeless tobacco: Never   Substance Use Topics     Alcohol use: Yes     Alcohol/week: 0.0 standard drinks of alcohol     Comment: occ            Family History:     Family History   Problem Relation Age of Onset     C.A.D. Father      Pancreatic Cancer Father      Hypertension Mother      Cancer Mother         Uterine     C.A.D. Maternal Grandmother      C.A.D. Paternal Grandfather      C.A.D. Paternal Grandmother      Neurologic Disorder Maternal Grandfather         Parkinson     Anesthesia Reaction No family hx of              Allergies:    No Known Allergies         Medications:     Current Outpatient Medications   Medication Sig     sildenafil (REVATIO) 20 MG tablet Take 3 tablets (60 mg) by mouth daily as needed     No current facility-administered medications for this visit.            Review of Systems:     12-point ROS reviewed and abnormalities stated in the history.         Physical Exam:     Constitutional - looks well, in no apparent distress  Eyes - no redness or discharge  Respiratory -breathing appears  comfortable.  No cough.  Skin - No appreciable discoloration or lesions (very limited exam)  Neurological - No apparent tremors. Speech fluent and articlate  Psychiatric - no signs of delirium or anxiety     Exam limited to that easily identified on a virtual visit. The rest of a comprehensive physical examination is deferred due to PHE (public health emergency) video visit restrictions.         Current Laboratory Data:   All laboratory and imaging data reviewed.           No data to display                              Again, thank you for allowing me to participate in the care of your patient.      Sincerely,    Rodri Lemus MD

## 2024-08-08 ENCOUNTER — PATIENT OUTREACH (OUTPATIENT)
Dept: CARE COORDINATION | Facility: CLINIC | Age: 64
End: 2024-08-08
Payer: COMMERCIAL

## 2024-08-22 ENCOUNTER — PATIENT OUTREACH (OUTPATIENT)
Dept: CARE COORDINATION | Facility: CLINIC | Age: 64
End: 2024-08-22
Payer: COMMERCIAL

## 2024-09-09 ENCOUNTER — HOSPITAL ENCOUNTER (OUTPATIENT)
Dept: CT IMAGING | Facility: CLINIC | Age: 64
Discharge: HOME | End: 2024-09-09
Attending: INTERNAL MEDICINE | Admitting: INTERNAL MEDICINE
Payer: COMMERCIAL

## 2024-09-09 ENCOUNTER — PATIENT OUTREACH (OUTPATIENT)
Dept: CARE COORDINATION | Facility: CLINIC | Age: 64
End: 2024-09-09
Payer: COMMERCIAL

## 2024-09-09 DIAGNOSIS — R91.8 PULMONARY NODULES: ICD-10-CM

## 2024-09-09 PROCEDURE — 71250 CT THORAX DX C-: CPT

## 2024-09-10 ENCOUNTER — VIRTUAL VISIT (OUTPATIENT)
Dept: PULMONOLOGY | Facility: CLINIC | Age: 64
End: 2024-09-10
Attending: INTERNAL MEDICINE
Payer: COMMERCIAL

## 2024-09-10 VITALS — BODY MASS INDEX: 22.88 KG/M2 | HEIGHT: 68 IN | WEIGHT: 151 LBS

## 2024-09-10 DIAGNOSIS — R91.8 PULMONARY NODULES: Primary | ICD-10-CM

## 2024-09-10 PROCEDURE — 99215 OFFICE O/P EST HI 40 MIN: CPT | Mod: 95 | Performed by: INTERNAL MEDICINE

## 2024-09-10 ASSESSMENT — PAIN SCALES - GENERAL: PAINLEVEL: NO PAIN (0)

## 2024-09-10 NOTE — LETTER
"9/10/2024       RE: Johny Jauregui  [address] [facility] [address]     Dear Colleague,    Thank you for referring your patient, Johny Jauregui[facility]. Please see a copy of my visit note below.    LUNG NODULE & INTERVENTIONAL PULMONARY CLINIC  CLINICS & SURGERY CENTER, [facility]   VIDEO VISIT    Johny Jauregui MRN# [id number]   Age: 63 year old YOB: 1960     Reason for Consultation: Lung Nodule    Requesting Physician: Rodri Lemus MD  [address] [place] [address]       The patient has been notified of following:   \"This video visit will be conducted via a call between you and your physician/provider. We have found that certain health care needs can be provided without the need for an in-person physical exam.  This service lets us provide the care you need with a video conversation.  If a prescription is necessary we can send it directly to your pharmacy.  If lab work is needed we can place an order for that and you can then stop by our lab to have the test done at a later time.  Video visits are billed at different rates depending on your insurance coverage.  Please reach out to your insurance provider with any questions.  If during the course of the call the physician/provider feels a video visit is not appropriate, you will not be charged for this service.\"  Patient has given verbal consent for Video visit? Yes  How would you like to obtain your AVS? Please refer to rooming staff note  Patient would like the video invitation sent by: Please refer to rooming staff note   Will anyone else be joining your video visit? Please refer to rooming staff note       Video-Visit Details     Type of service:  Video Visit  Video Start Time: 250  Video End Time: 302  Provider Name: Rodri Lemus MD, A   Originating Location (pt. Location): " Home  Provider Location: Off campus   Distant Location (provider location): Home/Clinic  Platform used for Video Visit: [employer]/Nick    Assessment and Plan:    1. Established solitary pulmonary lung nodule(s). No interval change over 6mo. Pursue surveillance, next CT in 12mo     Billing: I spent a total of 45min spent on date of encounter which includes prep time, visit with the patient and post visit work including documentation and nursing communication     Rodri Lemus MD, MHA  Associate Professor of Medicine  Section of Interventional Pulmonology   Division of Pulmonary, Allergy, Critical Care and Sleep Medicine   [facility]  Pager: [phone]   Office: [phone]  Email: [email or web address]    Roselia Johnson RN   Interventional Pulmonary Care Coordinator   Office: [phone]  Email: [email or web address]     Bry Steele  Interventional Pulmonary Surgery Scheduler   Office: [phone]  Email: [email or web address]         History:     Johny Jauregui is a 63 year old male with sig h/o for chronic cough, LGL syndrome who is here for evaluation/followup of Lung Nodule.     - No new resp sx or complaints. Denies dyspnea or cough.   - Here for 6mo evaluation  - Personal hx of cancer: no  - Family hx of cancer: no. Father had pancreatic cancer   - Exposure hx: Denies asbestos or radon exposure. Vac[place] in [place] in [place]  - Tobacco hx: Never Smoker.   - My interpretation of the images relevant for this visit includes: nodule   - My interpretation of the PFT's relevant for this visit includes: None      Culprit Nodule(s):   1: Right upper lobe nodule and is 12 mm in size/severity and non-calcified in morphology/quality. First seen by chest CT on 11/6/23. No interval growth     Other active medical problems include:   - NA          Past Medical History:      Past Medical History:   Diagnosis Date     Backache, unspecified 10/2003    to neurosurgeon      Segh-Krgtug-Omzezo syndrome     short [place] interval with palpatations             Past Surgical History:      Past Surgical History:   Procedure Laterality Date     COLONOSCOPY N/A 11/12/2015    Procedure: COLONOSCOPY;  Surgeon: Glynn Angel MD;  [facility]     EXCISE MASS FACE Right 5/25/2016    Procedure: EXCISE MASS FACE;  Surgeon: SANDOVAL Kenny MD;  [facility]     HERNIA REPAIR, INGUINAL RT/LT  1/25/2010    Bilateral     ZZC BECERRA W/O FACETEC FORAMOT/DSKC 1/2 VRT SEG, LUMBAR  11/2004    hemilaminectomy  L5-S1 and microdiscectomy            Social History:     Social History     Tobacco Use     Smoking status: Never     Smokeless tobacco: Never   Substance Use Topics     Alcohol use: Yes     Alcohol/week: 0.0 standard drinks of alcohol     Comment: occ            Family History:     Family History   Problem Relation Age of Onset     C.A.D. Father      Pancreatic Cancer Father      Hypertension Mother      Cancer Mother         Uterine     C.A.D. Maternal Grandmother      C.A.D. Paternal Grandfather      C.A.D. Paternal Grandmother      Neurologic Disorder Maternal Grandfather         Parkinson     Anesthesia Reaction No family hx of              Allergies:    No Known Allergies         Medications:     Current Outpatient Medications   Medication Sig Dispense Refill     sildenafil (REVATIO) 20 MG tablet Take 3 tablets (60 mg) by mouth daily as needed 60 tablet 0     No current facility-administered medications for this visit.            Review of Systems:     12-point ROS reviewed and abnormalities stated in the history.         Physical Exam:     Constitutional - looks well, in no apparent distress  Eyes - no redness or discharge  Respiratory -breathing appears comfortable.  No cough.  Skin - No appreciable discoloration or lesions (very limited exam)  Neurological - No apparent tremors. Speech fluent and articlate  Psychiatric - no signs of delirium or anxiety     Exam limited to that  easily identified on a virtual visit. The rest of a comprehensive physical examination is deferred due to PHE (public health emergency) video visit restrictions.         Current Laboratory Data:   All laboratory and imaging data reviewed.           No data to display                              Again, thank you for allowing me to participate in the care of your patient.      Sincerely,    Rodri Lemus MD

## 2024-09-10 NOTE — NURSING NOTE
Current patient location: Patient declined to provide     Is the patient currently in the state of [place]? YES    Visit mode:VIDEO    If the visit is dropped, the patient can be reconnected by: VIDEO VISIT: Text to cell phone:   Telephone Information:   Mobile [phone]       Will anyone else be joining the visit? NO  (If patient encounters technical issues they should call [phone]    How would you like to obtain your AVS? MyChart    Are changes needed to the allergy or medication list? No    Are refills needed on medications prescribed by this physician? NO    Rooming Documentation:  Questionnaire(s) completed      Reason for visit: [facility] GRANDA

## 2024-09-10 NOTE — PROGRESS NOTES
"LUNG NODULE & INTERVENTIONAL PULMONARY CLINIC  CLINICS & SURGERY CENTER, [facility], [facility]   VIDEO VISIT    Johny Jauregui MRN# [id number]   Age: 63 year old YOB: 1960     Reason for Consultation: Lung Nodule    Requesting Physician: Rodri Lemus MD  [address] [place] [address]       The patient has been notified of following:   \"This video visit will be conducted via a call between you and your physician/provider. We have found that certain health care needs can be provided without the need for an in-person physical exam.  This service lets us provide the care you need with a video conversation.  If a prescription is necessary we can send it directly to your pharmacy.  If lab work is needed we can place an order for that and you can then stop by our lab to have the test done at a later time.  Video visits are billed at different rates depending on your insurance coverage.  Please reach out to your insurance provider with any questions.  If during the course of the call the physician/provider feels a video visit is not appropriate, you will not be charged for this service.\"  Patient has given verbal consent for Video visit? Yes  How would you like to obtain your AVS? Please refer to rooming staff note  Patient would like the video invitation sent by: Please refer to rooming staff note   Will anyone else be joining your video visit? Please refer to rooming staff note       Video-Visit Details     Type of service:  Video Visit  Video Start Time: 250  Video End Time: 302  Provider Name: Rodri Lemus MD, [facility]   Originating Location (pt. Location): Home  Provider Location: Off campus   Distant Location (provider location): Home/Clinic  Platform used for Video Visit: AmWell/Doximity    Assessment and Plan:    1. Established solitary pulmonary lung nodule(s). No interval change over 6mo. Pursue surveillance, next CT in 12mo     Billing: I spent a total of " 45min spent on date of encounter which includes prep time, visit with the patient and post visit work including documentation and nursing communication     Rodri Lemus MD, MHA  Associate Professor of Medicine  Section of Interventional Pulmonology   Division of Pulmonary, Allergy, Critical Care and Sleep Medicine   [facility]  Pager: [phone]   Office: [phone]  Email: [email or web address]    Roselia Johnson RN   Interventional Pulmonary Care Coordinator   Office: [phone]  Email: [email or web address]     Bry Steele  Interventional Pulmonary Surgery Scheduler   Office: [phone]  Email: [email or web address]         History:     Johny Jauregui is a 63 year old male with sig h/o for chronic cough, LGL syndrome who is here for evaluation/followup of Lung Nodule.     - No new resp sx or complaints. Denies dyspnea or cough.   - Here for 6mo evaluation  - Personal hx of cancer: no  - Family hx of cancer: no. Father had pancreatic cancer   - Exposure hx: Denies asbestos or radon exposure. Vacations in [place] in the [place]  - Tobacco hx: Never Smoker.   - My interpretation of the images relevant for this visit includes: nodule   - My interpretation of the PFT's relevant for this visit includes: None      Culprit Nodule(s):   1: Right upper lobe nodule and is 12 mm in size/severity and non-calcified in morphology/quality. First seen by chest CT on 11/6/23. No interval growth     Other active medical problems include:   - NA          Past Medical History:      Past Medical History:   Diagnosis Date    Backache, unspecified 10/2003    to neurosurgeon    Sysa-Uqqssf-Njalwr syndrome     short CA interval with palpatations             Past Surgical History:      Past Surgical History:   Procedure Laterality Date    COLONOSCOPY N/A 11/12/2015    Procedure: COLONOSCOPY;  Surgeon: Glynn Angel MD;  Location: [facility]    EXCISE MASS FACE Right 5/25/2016     Procedure: EXCISE MASS FACE;  Surgeon: SANDOVAL Kenny MD;  [facility]: [facility]    HERNIA REPAIR, INGUINAL RT/LT  1/25/2010    Bilateral    ZZC BECERRA W/O FACETEC FORAMOT/DSKC 1/2 VRT SEG, LUMBAR  11/2004    hemilaminectomy  L5-S1 and microdiscectomy            Social History:     Social History     Tobacco Use    Smoking status: Never    Smokeless tobacco: Never   Substance Use Topics    Alcohol use: Yes     Alcohol/week: 0.0 standard drinks of alcohol     Comment: occ            Family History:     Family History   Problem Relation Age of Onset    C.A.D. Father     Pancreatic Cancer Father     Hypertension Mother     Cancer Mother         Uterine    C.A.D. Maternal Grandmother     C.A.D. Paternal Grandfather     C.A.D. Paternal Grandmother     Neurologic Disorder Maternal Grandfather         Parkinson    Anesthesia Reaction No family hx of              Allergies:    No Known Allergies         Medications:     Current Outpatient Medications   Medication Sig Dispense Refill    sildenafil (REVATIO) 20 MG tablet Take 3 tablets (60 mg) by mouth daily as needed 60 tablet 0     No current facility-administered medications for this visit.            Review of Systems:     12-point ROS reviewed and abnormalities stated in the history.         Physical Exam:     Constitutional - looks well, in no apparent distress  Eyes - no redness or discharge  Respiratory -breathing appears comfortable.  No cough.  Skin - No appreciable discoloration or lesions (very limited exam)  Neurological - No apparent tremors. Speech fluent and articlate  Psychiatric - no signs of delirium or anxiety     Exam limited to that easily identified on a virtual visit. The rest of a comprehensive physical examination is deferred due to PHE (public health emergency) video visit restrictions.         Current Laboratory Data:   All laboratory and imaging data reviewed.           No data to display

## 2024-10-12 ENCOUNTER — HEALTH MAINTENANCE LETTER (OUTPATIENT)
Age: 64
End: 2024-10-12

## 2025-02-12 ENCOUNTER — TELEPHONE (OUTPATIENT)
Dept: DERMATOLOGY | Facility: CLINIC | Age: 65
End: 2025-02-12
Payer: COMMERCIAL

## 2025-02-12 ENCOUNTER — TRANSCRIBE ORDERS (OUTPATIENT)
Dept: OTHER | Age: 65
End: 2025-02-12

## 2025-02-12 NOTE — TELEPHONE ENCOUNTER
Patient Details  Patient's Full Name  Johny Jauregui  Patient's Date of Birth  1960  Patient's Phone Number  [phone]  Patient's Email ID  [email or web address]  Has the patient visited [facility] in the past 3 years?  Yes  Address Details  Address  [address] [address] [place] [address]  [id number]  Appointment Preferences  Reason for appointment  Likely basal cell lesion on left side of nose  Preferred Provider  Jun Zhou  Preferred Location  [place]  Preferred Visit Type  In-person   Services   Do you need an  for your appointment?  No  Billing Preference  Which billing situation applies to the patient?  Patient has insurance  Insurance Information  Insurance Provider Name  [employer]  Member ID/ Policy Number  [id number]  Group Number  [id number]  Insurance Contact for Provider  [phone]  Policy [place]  Is the patient the Insurance Policy webb/subscriber?  Yes, patient is the policy webb  Callback Preferences  Could you share your preferred callback time with us?  No, I don't have a preference

## 2025-02-12 NOTE — TELEPHONE ENCOUNTER
Health Call Center    Phone Message    May a detailed message be left on voicemail: yes     Reason for Call: Appointment Intake    Referring Provider Name: SELF  Diagnosis and/or Symptoms: Non healing skin lesion that bleeds. Patient believes it may be BCC.    Per protocols, routing to clinic. If you would like him to be seen sooner, he is ok with [facility] or [facility].    Action Taken: Message routed to:  Adult Clinics: Dermatology p [id number]    Travel Screening: Not Applicable     Current date of service: 8/8/25 with Kaitlin Wu PA-C in [place]                                                                      cup/spoon/self fed/fed by clinician fed by clinician

## 2025-02-13 NOTE — TELEPHONE ENCOUNTER
Writer called pt, no answer. Left message for pt to return our call at [phone].     Can offer sooner spot check only appointment at  on 2/14 in 15 min slot but only for 1 spot.    Lori Casanova RN on 2/13/2025 at 8:55 AM

## 2025-02-13 NOTE — TELEPHONE ENCOUNTER
Pt returned call and is scheduled for tomorrow 2/14 at [place].    Lori Casanova RN on 2/13/2025 at 8:58 AM

## 2025-02-20 ENCOUNTER — TELEPHONE (OUTPATIENT)
Dept: DERMATOLOGY | Facility: CLINIC | Age: 65
End: 2025-02-20
Payer: COMMERCIAL

## 2025-02-20 NOTE — TELEPHONE ENCOUNTER
Excision/Mohs previsit information                                                    Diagnosis: squamous cell carcinoma in situ  Site(s): right alar crease     Is the surgical site below the waist?  NO  If yes, instruct the patient to purchase over the counter chlorhexidine surgical soap and wash all skin below the belly button twice before surgery    No Known Allergies    Review and update allergy and medication list    Do you take the following medications:  Coumadin, Eliquis, Pradaxa, Xarelto:  NO.  If on Coumadin, INR should be checked within 7 days of surgery.  Range should be 3.5 or less or within therapeutic range.    Do you currently or have you previously had any of the following conditions:  Hepatitis:  NO  HIV/AIDS:  NO  Prolonged bleeding or bleeding disorder:  NO  Pacemaker: NO  Defibrillator:  NO  History of artificial or heart valve replacement:  NO  Endocarditis (inflammation of the inner lining of the heart's chambers and valves):  NO  Have you ever had a prosthetic joint infection:  NO  Pregnant or Breastfeeding:  N/A  Mobility device (wheelchair, transfer difficulty): NO    Important Reminders:                                                      -For Mohs, notify patient they may be here through the lunch hour.  Be prepared to have down time and we recommend they bring a book or something to do.  -Ok to take all of their medications as prescribed  -Notify patient to eat prior to appointment.  The medication is more likely to cause you to feel jittery if you have an empty stomach.  -If face is being treated, please come with a make-up free face  -Avoid wearing earrings and necklaces  -If scalp is being treated, please come with clean hair free from product  -Patient will not be able to get the site wet for 48 hrs  -No submerging wound in standing water (lake, pool, bathtub, hot tub) for 2 weeks  -No physical activity for 48 hrs (further restrictions will be discussed by MD at time of visit)    If  any positives, send to RN for further review  Lori Casanova RN     Writer called pt to discuss pathology results. Pt scheduled for mohs consult/ procedure. Excision checklist complete and all questions were negative. Pt denied having any questions or concerns at this time.     Lori Casanova RN on 2/20/2025 at 9:59 AM    Final Diagnosis  A(1). Skin, R Alar crease, shave:  - Squamous cell carcinoma at least in situ - (see comment and description)     Electronically signed by Dung Rizo MD on 2/19/2025 at  2:29 PM      Result Notes     Kaitlin Wu PA-C  2/20/2025  7:30 AM CST Back to Top    Please let patient know that biopsy(s) showed the following with the below treatment recommendations:     A. SCCIS, right alar crease, Mohs Micrographic Surgery needed.

## 2025-03-06 ENCOUNTER — PATIENT OUTREACH (OUTPATIENT)
Dept: CARE COORDINATION | Facility: CLINIC | Age: 65
End: 2025-03-06
Payer: COMMERCIAL

## 2025-03-26 ENCOUNTER — VIRTUAL VISIT (OUTPATIENT)
Dept: DERMATOLOGY | Facility: CLINIC | Age: 65
End: 2025-03-26
Payer: COMMERCIAL

## 2025-03-26 DIAGNOSIS — D04.39 SQUAMOUS CELL CARCINOMA IN SITU (SCCIS) OF SKIN OF NOSE: Primary | ICD-10-CM

## 2025-03-26 PROCEDURE — 99207 PR NO CHARGE LOS: CPT | Performed by: DERMATOLOGY

## 2025-03-26 NOTE — LETTER
3/26/2025      Johny Jauregui  [address] [facility] [address]      Dear Colleague,    Thank you for referring your patient, Johny Jauregui, to the [facility]. Please see a copy of my visit note below.    [facility] Dermatology Note  Encounter Date: Mar 26, 2025  Store-and-Forward and Telephone. Location of teledermatologist: [facility].  Start time: 10:36 am. End time: 10:40 am.    Virtual Visit Details  Type of service:  Telephone Visit   Phone call duration: 4 minutes   Originating Location (pt. Location): Home  {PROVIDER LOCATION On-site should be selected for visits conducted from your clinic location or adjoining [facility] hospital, academic office, or other nearby [facility] building. Off-site should be selected for all other provider locations, including home:[id number]}  Distant Location (provider location):  On-site  Telephone visit completed due to the patient did not have access to video, while the distant provider did.    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  - Skin cancer(s): SCCis  - Location(s): right alar crease  - no other concerns today         Objective:   Skin: Focused examination of the right alar crease within the teledermatology photograph(s) on 2/14/2025 was performed.   - right alar crease, erythematous papule with overlying scale  **    Path report:   A(1). 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 Plan for Mohs micrographic surgery for skin  cancer(s) above:  *Review lab result(s): Dermpath report   - We discussed the nature of the diagnosis/condition above. We discussed the treatment options, including the risks benefits and expectations of these options. We recommend micrographic surgery as the most effective and most tissue sparing option for treatment, and the patient agrees to proceed with this.  The patient is aware of the risks, benefits and expectations of this procedure. The patient will be scheduled for this procedure, if not already done so.  - We anticipate the following closure type: Linear closure, such as complex linear closure (CLC) vs sliding or lifting flap    The patient was discussed with and evaluated by attending physician, Seth Alexandre DO.    Kaitlin Wu PA-C  [facility]   Dermatology     Staff Physician Comments:   I saw the photos and evaluated the patient with the physician assistant (Kaitlin Moss PA-C) and I agree with the assessment and plan. I was present for the key portions of the telephone call.    Seth Alexandre DO    Department of Dermatology  [facility]: Phone: [phone], Fax:[phone]  [facility]: Phone: [phone], Fax: [phone]      Again, thank you for allowing me to participate in the care of your patient.        Sincerely,        Seth Alexandre MD    Electronically signed

## 2025-03-26 NOTE — PROGRESS NOTES
[facility] Dermatology Note  Encounter Date: Mar 26, 2025  Store-and-Forward and Telephone. Location of teledermatologist: [facility].  Start time: 10:36 am. End time: 10:40 am.    Virtual Visit Details  Type of service:  Telephone Visit   Phone call duration: 4 minutes   Originating Location (pt. Location): Home    Distant Location (provider location):  On-site  Telephone visit completed due to the patient did not have access to video, while the distant provider did.    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  - Skin cancer(s): SCCis  - Location(s): right alar crease  - no other concerns today         Objective:   Skin: Focused examination of the right alar crease within the teledermatology photograph(s) on 2/14/2025 was performed.   - right alar crease, erythematous papule with overlying scale  **    Path report:   A(1). 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 Plan for Mohs micrographic surgery for skin cancer(s) above:  *Review lab result(s): Dermpath report   - We discussed the nature of the diagnosis/condition above. We discussed the treatment options, including the risks benefits and expectations of these options. We recommend micrographic surgery as the most effective and most tissue sparing option for treatment, and the patient agrees to proceed with this.  The patient is aware of the risks, benefits and expectations of this procedure. The patient will be scheduled for this procedure, if not  already done so.  - We anticipate the following closure type: Linear closure, such as complex linear closure (CLC) vs sliding or lifting flap    The patient was discussed with and evaluated by attending physician, Seth Alexandre DO.    Kaitlin Wu PA-C  [facility]   Dermatology     Staff Physician Comments:   I saw the photos and evaluated the patient with the physician assistant (Kaitlin Moss PA-C) and I agree with the assessment and plan. I was present for the key portions of the telephone call.    Seth Alexandre DO    Department of Dermatology  [facility] [place]: Phone: [phone], Fax:[phone]  [facility]: Phone: [phone], Fax: [phone]

## 2025-03-26 NOTE — NURSING NOTE
Teledermatology Nurse Call Patients:     Are you in the [facility] at the time of the encounter? yes    Today's visit will be billed to you and your insurance.    A teledermatology visit is not as thorough as an in-person visit and the quality of the photograph sent may not be of the same quality as that taken by the dermatology clinic.

## 2025-04-10 ENCOUNTER — OFFICE VISIT (OUTPATIENT)
Dept: DERMATOLOGY | Facility: CLINIC | Age: 65
End: 2025-04-10
Payer: COMMERCIAL

## 2025-04-10 VITALS — DIASTOLIC BLOOD PRESSURE: 59 MMHG | SYSTOLIC BLOOD PRESSURE: 156 MMHG | OXYGEN SATURATION: 97 % | HEART RATE: 77 BPM

## 2025-04-10 DIAGNOSIS — D04.39 SQUAMOUS CELL CARCINOMA IN SITU (SCCIS) OF SKIN OF NOSE: Primary | ICD-10-CM

## 2025-04-10 ASSESSMENT — PAIN SCALES - GENERAL: PAINLEVEL_OUTOF10: NO PAIN (0)

## 2025-04-10 NOTE — NURSING NOTE
Johny Jauregui's chief complaint for this visit includes:  Chief Complaint   Patient presents with    Mohs     SCCis right alar crease       PCP: No Ref-Primary, Physician    Referring Provider:  Kaitlin Wu PA-C  [address] [place] [address]    BP (!) 156/59   Pulse 77   SpO2 97%   No Pain (0)      No Known Allergies      Do you need any medication refills at today's visit? No    Seble Charlton CMA

## 2025-04-10 NOTE — LETTER
4/10/2025      Johny Jauregui  [address]   [place] [address]      Dear Colleague,    Thank you for referring your patient, Johny Jauregui, to the [facility]. Please see a copy of my visit note below.    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[place]   ____________________________________________________________________________________________    Date of Service:  Apr 10, 2025  Surgery: Mohs micrographic surgery    Case 1  Repair Type: Advancement flap with Burows Full Thickenss Skin Graft   Repair Size: 3.5 x 2.0 cm Flap (FTSG 1.0cmx 0.6cm)  Suture Material: 4-0 monocryl, 5-0 monocryl, 5-0 express gut  Tumor Type: SCCIS - Squamous cell carcinoma in situ  Location: Right alar crease  Derm-Path Accession #: [id number]  PreOp Size: 0.9 x 0.7 cm  PostOp Size: 1.1 x 1.2 cm  Mohs Accession #: [id number]  Level of Defect: muscle      Procedure:  We discussed the principles of treatment and most likely complications including scarring, bleeding, infection, swelling, pain, crusting, nerve damage, large wound,  incomplete excision, wound dehiscence,  nerve damage, recurrence, and a second procedure may be recommended to obtain the best cosmetic or functional result.    Informed consent was obtained and the patient underwent the procedure as follows:  The patient was placed supine on the operating table.  The cancer was identified, outlined with a marker, and verified by the patient.  The entire surgical field was prepped with Hibiclens.  The surgical site was anesthetized using Lidocaine 1% with epi 1:100,000.      The area of clinically apparent tumor was debulked. The layer of tissue was then surgically excised using a #15 blade and was then transferred onto a specimen sheet  maintaining the orientation of the specimen. Hemostasis was obtained using Other (comments) (hyfrecator). The wound site was then covered with a dressing while the tissue samples were processed for examination.    The excised tissue was transported to the Mohs histology laboratory maintaining the tissue orientation.  The tissue specimen was relaxed so that the entire surgical margin was in a a single horizontal plane for sectioning and inked for precise mapping.  A precise reference map was drawn to reflect the sectioning of the specimen, colored inking of the margins, and orientation on the patient. The tissue was processed using horizontal sectioning of the base and continuous peripheral margins.  The histopathologic sections were reviewed in conjunction with the reference map.    Total blocks: 1    Total slides:  3    There were no cancer cells visualized on examination, therefore Mohs surgery was complete.    RECONSTRUCTION:  Advancement Flap with Burow's Graft     Primary Surgeon: Dr.Adam Alexandre   Assistant Surgeon:      PROCEDURE:  The patient was taken to the operative suite and placed in a supine position on the operating room table.  The defect was identified.  Appropriate markings were made with a marking pen to plan the reconstruction.  The area was then infiltrated with Lidocaine 1% with epi 1:100,000 3.0ml.  The area was then prepped in a sterile fashion with Hibiclens and sterile drapes were applied.  The wound was debeveled and undermined broadly in all directions to the level of fat.  Hemostasis was obtained with Other (comments) (hyfrecator). The advancement flap was then designed by incising to the level of fat. The flap was further undermined in all directions.    Hemostasis was again obtained using hyfrecator.  The flap was then advanced into (carried over) the defect and secured using buried dermal sutures.   The secondary defect wound edges were closed with buried dermal sutures.   The epidermis was then carefully approximated using simple running (5-0 express gut) epidermal sutures. Redundant areas of tissue were excised using the triangulation technique.The wound edges were sutured in similar fashion.    The remaining defect was repaired with a Burows wedge graft from the raised tissue cone of the advancement flap.  The raised cone was removed from one end of the flap to serve as a full thickness skin graft. Hemostasis was obtained using Other (comments) (hyfrecator). The 1.0cm x 0.6cm graft was trimmed of fat and placed in saline gauze.  The graft was placed onto the recipient site and secured with simple running other (5-0 express gut) epidermal sutures. A tacking suture was placed in the center of the graft to eliminate dead space and appose the underside of the graft with the wound bed. The graft was trimmed to fit as needed with blunt scissors. Careful attention was given to even approximation of the wound edges. A pressure dressing was applied to both surgical sites.    The wound was cleansed with saline and ointment was applied along the wound surface.  A sterile pressure of non-adherent gauze was applied and wound care instructions were given verbally and in writing.   The patient left the operating suite in stable condition. Anticipate Dermabrasion to be used as a second stage of this reconstruction.  .    Repair Size: 3.5 x 2.0 cm  Sutures Used:  Deep: monocryl 4-0;monocryl 5-0 Superficial:(5-0 express gut)    Anesthesia Visit Total (ml): 5 ml    The Attending Physician for the entire procedure and always immediately available.    Staff Involved:   Scribe/Fellow/Staff     Scribe Disclosure:   I, NUNO AYERS, am serving as a scribe; to document services personally performed by Seth Alexandre MD -based on data collection and the provider's statements to me.    Son Mercedes MD    Staff Physician Comments:   I saw and evaluated the patient with the Mohs Surgery Fellow (Dr. Cline  Mago) and I agree with the assessment and plan and the above description of the procedure as documented by the scribe. I personally performed the entire procedure and entire exam. I was immediately available in the clinic throughout the procedures.     Seth Alexandre DO    Department of Dermatology  [facility]: Phone: [phone], Fax:[phone]  [facility]: Phone: [phone], Fax: [phone]    Care and Laboratory Testing Performed at:  [facility]   Dermatology Clinic  [address] [place] [address]      Again, thank you for allowing me to participate in the care of your patient.        Sincerely,        Seth Alexandre MD    Electronically signed

## 2025-04-10 NOTE — NURSING NOTE
The following medication was given:     MEDICATION:  Lidocaine with epinephrine 1% 1:[id number]  ROUTE: SQ  SITE: see procedure note  DOSE: 5.0 mL  LOT #: [id number]  : Pfizer  EXPIRATION DATE: 11/30/25  NDC#: 4642-4814-66  Was there drug waste? 1 mL  Multi-dose vial: Yes    Vaseline and pressure dressing applied to Mohs site on right alar crease.  Wound care instructions reviewed with patient and AVS provided.  Patient verbalized understanding.  Post op appointment scheduled Yes. Patient will follow up for suture removal: N/A.  No further questions or concerns at this time.      Seble Charlton CMA  April 10, 2025

## 2025-04-10 NOTE — PROGRESS NOTES
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[place]   ____________________________________________________________________________________________    Date of Service:  Apr 10, 2025  Surgery: Mohs micrographic surgery    Case 1  Repair Type: Advancement flap with Burows Full Thickenss Skin Graft   Repair Size: 3.5 x 2.0 cm Flap (FTSG 1.0cmx 0.6cm)  Suture Material: 4-0 monocryl, 5-0 monocryl, 5-0 express gut  Tumor Type: SCCIS - Squamous cell carcinoma in situ  Location: Right alar crease  Derm-Path Accession #: [id number]  PreOp Size: 0.9 x 0.7 cm  PostOp Size: 1.1 x 1.2 cm  Mohs Accession #: [id number]  Level of Defect: muscle      Procedure:  We discussed the principles of treatment and most likely complications including scarring, bleeding, infection, swelling, pain, crusting, nerve damage, large wound,  incomplete excision, wound dehiscence,  nerve damage, recurrence, and a second procedure may be recommended to obtain the best cosmetic or functional result.    Informed consent was obtained and the patient underwent the procedure as follows:  The patient was placed supine on the operating table.  The cancer was identified, outlined with a marker, and verified by the patient.  The entire surgical field was prepped with Hibiclens.  The surgical site was anesthetized using Lidocaine 1% with epi 1:100,000.      The area of clinically apparent tumor was debulked. The layer of tissue was then surgically excised using a #15 blade and was then transferred onto a specimen sheet maintaining the orientation of the specimen. Hemostasis was obtained using Other (comments) (hyfrecator). The wound site was then covered with a dressing while the tissue samples were processed for examination.    The excised tissue was transported  to the Mohs histology laboratory maintaining the tissue orientation.  The tissue specimen was relaxed so that the entire surgical margin was in a a single horizontal plane for sectioning and inked for precise mapping.  A precise reference map was drawn to reflect the sectioning of the specimen, colored inking of the margins, and orientation on the patient. The tissue was processed using horizontal sectioning of the base and continuous peripheral margins.  The histopathologic sections were reviewed in conjunction with the reference map.    Total blocks: 1    Total slides:  3    There were no cancer cells visualized on examination, therefore Mohs surgery was complete.    RECONSTRUCTION:  Advancement Flap with Burow's Graft     Primary Surgeon: Dr.Adam Alexandre   Assistant Surgeon:      PROCEDURE:  The patient was taken to the operative suite and placed in a supine position on the operating room table.  The defect was identified.  Appropriate markings were made with a marking pen to plan the reconstruction.  The area was then infiltrated with Lidocaine 1% with epi 1:100,000 3.0ml.  The area was then prepped in a sterile fashion with Hibiclens and sterile drapes were applied.  The wound was debeveled and undermined broadly in all directions to the level of fat.  Hemostasis was obtained with Other (comments) (hyfrecator). The advancement flap was then designed by incising to the level of fat. The flap was further undermined in all directions.    Hemostasis was again obtained using hyfrecator.  The flap was then advanced into (carried over) the defect and secured using buried dermal sutures.   The secondary defect wound edges were closed with buried dermal sutures.  The epidermis was then carefully approximated using simple running (5-0 express gut) epidermal sutures. Redundant areas of tissue were excised using the triangulation technique.The wound edges were sutured in similar fashion.    The remaining  defect was repaired with a Burows wedge graft from the raised tissue cone of the advancement flap.  The raised cone was removed from one end of the flap to serve as a full thickness skin graft. Hemostasis was obtained using Other (comments) (hyfrecator). The 1.0cm x 0.6cm graft was trimmed of fat and placed in saline gauze.  The graft was placed onto the recipient site and secured with simple running other (5-0 express gut) epidermal sutures. A tacking suture was placed in the center of the graft to eliminate dead space and appose the underside of the graft with the wound bed. The graft was trimmed to fit as needed with blunt scissors. Careful attention was given to even approximation of the wound edges. A pressure dressing was applied to both surgical sites.    The wound was cleansed with saline and ointment was applied along the wound surface.  A sterile pressure of non-adherent gauze was applied and wound care instructions were given verbally and in writing.   The patient left the operating suite in stable condition. Anticipate Dermabrasion to be used as a second stage of this reconstruction.  .    Repair Size: 3.5 x 2.0 cm  Sutures Used:  Deep: monocryl 4-0;monocryl 5-0 Superficial:(5-0 express gut)    Anesthesia Visit Total (ml): 5 ml    The Attending Physician for the entire procedure and always immediately available.    Staff Involved:   Scribe/Fellow/Staff     Scribe Disclosure:   NUNO LOVETT, am serving as a scribe; to document services personally performed by Seth Alexandre MD -based on data collection and the provider's statements to me.    Son Mercedes MD    Staff Physician Comments:   I saw and evaluated the patient with the Mohs Surgery Fellow (Dr. Son Mercedes) and I agree with the assessment and plan and the above description of the procedure as documented by the scribe. I personally performed the entire procedure and entire exam. I was immediately available in the clinic throughout  the procedures.     Seth Alexandre DO    Department of Dermatology  [facility]: Phone: [phone], Fax:[phone]  [facility] [place]: Phone: [phone], Fax: [phone]    Care and Laboratory Testing Performed at:  [facility]   Dermatology Clinic  [address] [place] [address]

## 2025-04-10 NOTE — PATIENT INSTRUCTIONS
Caring for your skin after surgery    For the first 48 hours after your surgery:  Leave the pressure dressing on and keep it dry. If it should come loose, you may re-tape it, but do not take it off.  Relax and take it easy.  Do not do any vigorous exercise, heavy lifting or bending forward. This could cause the wound to bleed.  If the wound is on your head, sleeping with your head elevated for the first few nights will help with swelling and bleeding. (Use linens/pillow cases that would be ok to get blood on in the event there is some oozing from the bandage).  Post-operative pain is usually mild.  You may alternate between 1000 mg of Tylenol (acetaminophen) and 400 mg of Ibuprofen every 4 hours.  This means, for example, that you could take the followin,000 mg of Tylenol, followed 4 hours later by 400 mg Ibuprofen, followed 4 hours later with 1,000 mg of Tylenol, and so forth.  Do not take more than 4,000 mg of acetaminophen in a 24-hour period or 3200 mg of Ibuprofen in a 24-hr period.    Avoid alcohol and vitamin E as these may increase your tendency to bleed.  Apply an ice pack for 20 min every 2-3 hours while awake.  This may help reduce swelling, bruising, and pain.  Make sure the ice pack is waterproof so that the pressure bandage doesn't get wet.  You may see a small amount of drainage or blood on your pressure bandage. This is normal. However:  If drainage or bleeding continues or saturates the bandage, you will need to apply firm pressure over the bandage with a clean washcloth for 15 minutes.    Remove the saturated bandage.  If bleeding has stopped, apply Vaseline over the suture line and cover with a non-stick bandage.  To add some pressure over the wound, fold a piece of gauze and tape over the area.  If bleeding continues after applying pressure for 15 minutes, apply an ice pack with gentle pressure to the bandaged area for another 15 minutes.  If bleeding still continues, call our office or go to  the nearest emergency room.    48 Hours After Surgery:  Remove the bandage.  If it seems sticky or too difficult to get off, you may need to soak it off in the shower.  Wash wound with a mild soap and water.  Use caution when washing the wound, be gentle and do not let the forceful shower stream hit the wound directly.  Pat dry.  Apply Vaseline or Aquaphor ointment (from a new container or tube) over the suture line with a Q-tip.  Cover the site with a bandage.  Do this daily until the sutures have dissolved.    What to expect:  The first couple of days your wound may be tender and may bleed slightly when doing wound care.  There may be swelling and bruising around the wound, especially if it is near the eyes. For your comfort, you may apply ice or cold compresses to the area.  The area around your wound may be numb for several weeks or even months.  You may experience periodic sharp pain or mild itching around the wound as it heals.   The suture line will look dark pink at first and the edges of the wound will be reddened. This will lighten up each day.    Call Us If:  You have bleeding that will not stop after applying pressure and ice.  You have pain that is not controlled with Tylenol and Ibuprofen.  You have signs or symptoms of an infection such as fever over 100 degrees Fahrenheit, redness, swelling, or warmth spreading from the wound, increasing pain after the first 48 hours, or white/yellow/green drainage from the wound (may or may not have a foul odor).    [facility]: [phone] - ask for [facility]  [facility]: [phone]  For urgent needs outside of business hours call the [facility] at [phone] and ask to speak with/page the dermatology resident on call

## 2025-04-23 ENCOUNTER — MYC MEDICAL ADVICE (OUTPATIENT)
Dept: DERMATOLOGY | Facility: CLINIC | Age: 65
End: 2025-04-23
Payer: COMMERCIAL

## 2025-04-24 ENCOUNTER — VIRTUAL VISIT (OUTPATIENT)
Dept: DERMATOLOGY | Facility: CLINIC | Age: 65
End: 2025-04-24
Payer: COMMERCIAL

## 2025-04-24 DIAGNOSIS — Z86.007 HISTORY OF SQUAMOUS CELL CARCINOMA IN SITU (SCCIS): ICD-10-CM

## 2025-04-24 DIAGNOSIS — Z48.89 ENCOUNTER FOR POSTOPERATIVE WOUND CHECK: Primary | ICD-10-CM

## 2025-04-24 NOTE — TELEPHONE ENCOUNTER
Photos in for telephone appointment today with francheska.    Lori Casanova RN on 4/24/2025 at 9:54 AM

## 2025-04-24 NOTE — NURSING NOTE
Teledermatology Nurse Call Patients:     Are you in the [facility] at the time of the encounter? yes    Today's visit will be billed to you and your insurance.    A teledermatology visit is not as thorough as an in-person visit and the quality of the photograph sent may not be of the same quality as that taken by the dermatology clinic.    Patient states that he feels healing is going well. He is still using Vaseline on the area.     Jennifer Bedolla, CMA

## 2025-04-24 NOTE — PROGRESS NOTES
Virtual Visit Details    Type of service: Telephone Visit   Phone call duration: 3 minutes   Originating Location (pt. Location): Home  Distant Location (provider location):  On-site  Telephone visit completed due to the patient did not have access to video, while the distant provider did.     Dermatologic Mohs Surgery Post-Op Wound Check   Encounter Date: Apr 24, 2025  Store-and-Forward and Telephone. Location of teledermatologist: [facility].  Start time: 1248. End time: 1251.    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  - Wound appearance improving daily.   - Denies pain, weeping.   - Skin graft area depressed and eroded, but still healing.   - no other concerns today    Objective:   Focused examination of the R alar crease within the teledermatology photograph(s) on 4/23/25 was performed.   - The surgical site noted above is clean, dry. The FTSG area has some superficial necrosis, but minimal adjacent erythema and edema. Advancement flap sutured lines healing well, sutures dissolved.             Assessment and Plan:     1. Post-op wound evaluation status post Mohs and Advancement flap with [place] FTSG repair of SCCIS of R alar crease on 4/10/25.   - The patient's surgery site(s) is/are healing. No evidence of infection on examination today. Some superficial necrosis of the FTSG are, but deeper dermis appears viable. Continue petroleum jelly and occlusion.   - The patient was told to continue with wound cares until the area(s) is/are no longer crusted.   - The patient should follow up with dermatologic surgery PRN (3 months scar eval if patient  interested), as well as continue with regular skin exams in general dermatology clinic.    Patient was discussed with and evaluated by attending physician Dr. Seth Alexandre.    Scribe Disclosure:   I, Lori Alfredo, am serving as a scribe to document services personally performed by Seth Alexandre MD based on data collection and the provider's statements to me.     Provider Disclosure:   The documentation recorded by the scribe accurately reflects the services I personally performed and the decisions made by me.    Seth Alexandre DO    Department of Dermatology  [facility]: Phone: [phone], Fax:[phone]  [facility]: Phone: [phone], Fax: [phone]

## 2025-04-24 NOTE — LETTER
4/24/2025      Johny Jauregui  [address] [facility] [address]      Dear Colleague,    Thank you for referring your patient, Johny Jauregui, to the [facility]. Please see a copy of my visit note below.    Virtual Visit Details    Type of service: Telephone Visit   Phone call duration: 3 minutes   Originating Location (pt. Location): Home  Distant Location (provider location):  On-site  Telephone visit completed due to the patient did not have access to video, while the distant provider did.     Dermatologic Mohs Surgery Post-Op Wound Check   Encounter Date: Apr 24, 2025  Store-and-Forward and Telephone. Location of teledermatologist: [facility].  Start time: 1248. End time: 1251.    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  - Wound appearance improving daily.   - Denies pain, weeping.   - Skin graft area depressed and eroded, but still healing.   - no other concerns today    Objective:   Focused examination of the R alar crease within the teledermatology photograph(s) on 4/23/25 was performed.   - The surgical site noted above is clean, dry. The FTSG area has some superficial necrosis, but minimal adjacent erythema and edema. Advancement flap sutured lines healing well, sutures dissolved.             Assessment and Plan:     1. Post-op wound evaluation status post Mohs and Advancement flap with [place] FTSG repair of SCCIS of R alar crease on 4/10/25.   - The patient's surgery site(s) is/are healing. No evidence of infection on examination today. Some superficial necrosis of the FTSG are, but deeper dermis appears  viable. Continue petroleum jelly and occlusion.   - The patient was told to continue with wound cares until the area(s) is/are no longer crusted.   - The patient should follow up with dermatologic surgery PRN (3 months scar eval if patient interested), as well as continue with regular skin exams in general dermatology clinic.    Patient was discussed with and evaluated by attending physician Dr. Seth Alexandre.    Scribe Disclosure:   I, Lori Fuentes, am serving as a scribe to document services personally performed by Seth Alexandre MD based on data collection and the provider's statements to me.     Provider Disclosure:   The documentation recorded by the scribe accurately reflects the services I personally performed and the decisions made by me.    Seth Alexandre DO    Department of Dermatology  [facility]: Phone: [phone], Fax:[phone]  [facility]: Phone: [phone], Fax: [phone]         Again, thank you for allowing me to participate in the care of your patient.        Sincerely,        Seth Alexandre MD    Electronically signed

## 2025-08-14 ENCOUNTER — OFFICE VISIT (OUTPATIENT)
Dept: DERMATOLOGY | Facility: CLINIC | Age: 65
End: 2025-08-14
Payer: COMMERCIAL

## 2025-08-14 DIAGNOSIS — L82.1 SEBORRHEIC KERATOSES: ICD-10-CM

## 2025-08-14 DIAGNOSIS — L57.0 ACTINIC KERATOSES: ICD-10-CM

## 2025-08-14 DIAGNOSIS — L81.4 SOLAR LENTIGINOSIS: ICD-10-CM

## 2025-08-14 DIAGNOSIS — D48.5 NEOPLASM OF UNCERTAIN BEHAVIOR OF SKIN: ICD-10-CM

## 2025-08-14 DIAGNOSIS — Z12.83 SCREENING EXAM FOR SKIN CANCER: ICD-10-CM

## 2025-08-14 DIAGNOSIS — Z85.828 HISTORY OF NONMELANOMA SKIN CANCER: Primary | ICD-10-CM

## 2025-08-14 DIAGNOSIS — D18.01 CHERRY ANGIOMA: ICD-10-CM

## 2025-08-14 DIAGNOSIS — D22.9 MULTIPLE BENIGN NEVI: ICD-10-CM

## 2025-08-14 ASSESSMENT — PAIN SCALES - GENERAL: PAINLEVEL_OUTOF10: NO PAIN (0)

## 2025-08-18 ENCOUNTER — RESULTS FOLLOW-UP (OUTPATIENT)
Dept: DERMATOLOGY | Facility: CLINIC | Age: 65
End: 2025-08-18
Payer: COMMERCIAL